# Patient Record
Sex: MALE | Race: WHITE | NOT HISPANIC OR LATINO | Employment: FULL TIME | URBAN - METROPOLITAN AREA
[De-identification: names, ages, dates, MRNs, and addresses within clinical notes are randomized per-mention and may not be internally consistent; named-entity substitution may affect disease eponyms.]

---

## 2023-02-17 ENCOUNTER — OFFICE VISIT (OUTPATIENT)
Dept: FAMILY MEDICINE | Facility: CLINIC | Age: 43
End: 2023-02-17
Payer: COMMERCIAL

## 2023-02-17 VITALS
SYSTOLIC BLOOD PRESSURE: 140 MMHG | HEIGHT: 71 IN | DIASTOLIC BLOOD PRESSURE: 88 MMHG | HEART RATE: 100 BPM | TEMPERATURE: 98.1 F | BODY MASS INDEX: 35.39 KG/M2 | WEIGHT: 252.8 LBS | OXYGEN SATURATION: 97 %

## 2023-02-17 DIAGNOSIS — I10 PRIMARY HYPERTENSION: Primary | ICD-10-CM

## 2023-02-17 DIAGNOSIS — Z13.220 LIPID SCREENING: ICD-10-CM

## 2023-02-17 DIAGNOSIS — F17.200 VAPING NICOTINE DEPENDENCE, NON-TOBACCO PRODUCT: ICD-10-CM

## 2023-02-17 DIAGNOSIS — E66.01 SEVERE OBESITY (BMI 35.0-35.9 WITH COMORBIDITY) (H): ICD-10-CM

## 2023-02-17 PROBLEM — R80.9 PROTEINURIA: Status: ACTIVE | Noted: 2017-02-16

## 2023-02-17 PROBLEM — R06.83 SNORING: Status: ACTIVE | Noted: 2017-02-16

## 2023-02-17 PROBLEM — R03.0 ELEVATED BLOOD PRESSURE READING WITHOUT DIAGNOSIS OF HYPERTENSION: Status: ACTIVE | Noted: 2017-02-16

## 2023-02-17 PROBLEM — R79.89 SERUM CREATININE RAISED: Status: ACTIVE | Noted: 2017-03-30

## 2023-02-17 PROBLEM — E66.9 OBESITY: Status: ACTIVE | Noted: 2023-02-17

## 2023-02-17 LAB
ALBUMIN UR-MCNC: NEGATIVE MG/DL
ANION GAP SERPL CALCULATED.3IONS-SCNC: 11 MMOL/L (ref 7–15)
APPEARANCE UR: CLEAR
BILIRUB UR QL STRIP: NEGATIVE
BUN SERPL-MCNC: 11.8 MG/DL (ref 6–20)
CALCIUM SERPL-MCNC: 9.5 MG/DL (ref 8.6–10)
CHLORIDE SERPL-SCNC: 106 MMOL/L (ref 98–107)
CHOLEST SERPL-MCNC: 178 MG/DL
COLOR UR AUTO: YELLOW
CREAT SERPL-MCNC: 1.06 MG/DL (ref 0.67–1.17)
DEPRECATED HCO3 PLAS-SCNC: 22 MMOL/L (ref 22–29)
GFR SERPL CREATININE-BSD FRML MDRD: 90 ML/MIN/1.73M2
GLUCOSE SERPL-MCNC: 90 MG/DL (ref 70–99)
GLUCOSE UR STRIP-MCNC: NEGATIVE MG/DL
HDLC SERPL-MCNC: 36 MG/DL
HGB UR QL STRIP: NEGATIVE
KETONES UR STRIP-MCNC: NEGATIVE MG/DL
LDLC SERPL CALC-MCNC: 124 MG/DL
LEUKOCYTE ESTERASE UR QL STRIP: NEGATIVE
NITRATE UR QL: NEGATIVE
NONHDLC SERPL-MCNC: 142 MG/DL
PH UR STRIP: 5 [PH] (ref 5–7)
POTASSIUM SERPL-SCNC: 4.2 MMOL/L (ref 3.4–5.3)
SODIUM SERPL-SCNC: 139 MMOL/L (ref 136–145)
SP GR UR STRIP: 1.02 (ref 1–1.03)
TRIGL SERPL-MCNC: 91 MG/DL
UROBILINOGEN UR STRIP-ACNC: 0.2 E.U./DL

## 2023-02-17 PROCEDURE — 99204 OFFICE O/P NEW MOD 45 MIN: CPT | Performed by: NURSE PRACTITIONER

## 2023-02-17 PROCEDURE — 36415 COLL VENOUS BLD VENIPUNCTURE: CPT | Performed by: NURSE PRACTITIONER

## 2023-02-17 PROCEDURE — 81003 URINALYSIS AUTO W/O SCOPE: CPT | Mod: QW | Performed by: NURSE PRACTITIONER

## 2023-02-17 PROCEDURE — 80061 LIPID PANEL: CPT | Performed by: NURSE PRACTITIONER

## 2023-02-17 PROCEDURE — 80048 BASIC METABOLIC PNL TOTAL CA: CPT | Performed by: NURSE PRACTITIONER

## 2023-02-17 RX ORDER — LISINOPRIL 10 MG/1
10 TABLET ORAL DAILY
Qty: 30 TABLET | Refills: 1 | Status: SHIPPED | OUTPATIENT
Start: 2023-02-17

## 2023-02-17 ASSESSMENT — ENCOUNTER SYMPTOMS: HYPERTENSION: 1

## 2023-02-17 NOTE — PROGRESS NOTES
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  Explained the use risks and benefits of lisinopril, labs pending  Plan: Basic metabolic panel, UA reflex to Microscopic        - lab collect, Lipid panel reflex to direct LDL        Fasting        Also counseled that sleep apnea can complicate hypertension.   -Controlled  -Continue current medications as can obesity and nicotine and caffeine use.  He will return in 3 weeks to reassess blood pressure and to again address tobacco use cessation, weight loss.    (Z13.220) Lipid screening  Comment:   Plan: lisinopril (ZESTRIL) 10 MG tablet            (F17.200) Vaping nicotine dependence, non-tobacco product  Comment:   Plan:     (E66.01,  Z68.35) Severe obesity (BMI 35.0-35.9 with comorbidity) (H)  Comment:   Plan:              BMI:   Estimated body mass index is 35.76 kg/m  as calculated from the following:    Height as of this encounter: 1.791 m (5' 10.5\").    Weight as of this encounter: 114.7 kg (252 lb 12.8 oz).   Weight management plan: Discussed healthy diet and exercise guidelines        No follow-ups on file.    Charlotte Steward NP  [facility]    Anna Beatty is a 42 year old presenting for the following health issues: has been having elevated BP reading at home and stores, no diagnoses, is a  and wants to be sure he will pass with a good BP readng----at DOT physicals , bp has been high 160 systolic.  He has a family with kids and wants to make sure he is around to watch them grow up.  He also has this new job as a  and does not want to compromise that    Has been able to cut out sugar, had been drinking 24 pack or Mountain Dew. He " "has since avoided corn syrup    Non smoker but had been a smoker , vaping now the last 5 years    Weight has increased as he is merely driving now, He works 14 hour days. Has maybe gained 12# in last year.    Brings food from home to eat on the road, trying to avoid fast food    Does not want to pursue sleep study at this time as he is afraid will cause an eligibility for driving.  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  He exercises with enough effort to increase his heart rate 7 days per week.   He is taking medications regularly.       Hypertension Follow-up      Do you check your blood pressure regularly outside of the clinic? Yes, off/on    Are you following a low salt diet? Yes, trying to be mindful of what he eats    Are your blood pressures ever more than 140 on the top number (systolic) OR more   than 90 on the bottom number (diastolic), for example 140/90? Yes        Review of Systems         Objective    BP (!) 140/88 (BP Location: Right arm, Patient Position: Sitting)   Pulse 100   Temp 98.1  F (36.7  C)   Ht 1.791 m (5' 10.5\")   Wt 114.7 kg (252 lb 12.8 oz)   SpO2 97%   BMI 35.76 kg/m    Body mass index is 35.76 kg/m .  Physical Exam   He is alert and oriented no acute distress skin warm pink dry no rash neck supple no cervical lymphadenopathy  Heart S1-S2 regular rate rhythm lungs are clear  Blood pressure is elevated                    "

## 2023-02-17 NOTE — LETTER
February 17, 2023      Steven M Kromanaker  [address] [facility] [address]        Dear Mr.Kromanaker,    We are writing to inform you of your test results.    Lab work looks good, see you in about 3 weeks.    Resulted Orders   Basic metabolic panel   Result Value Ref Range    Sodium 139 136 - 145 mmol/L    Potassium 4.2 3.4 - 5.3 mmol/L    Chloride 106 98 - 107 mmol/L    Carbon Dioxide (CO2) 22 22 - 29 mmol/L    Anion Gap 11 7 - 15 mmol/L    Urea Nitrogen 11.8 6.0 - 20.0 mg/dL    Creatinine 1.06 0.67 - 1.17 mg/dL    Calcium 9.5 8.6 - 10.0 mg/dL    Glucose 90 70 - 99 mg/dL    GFR Estimate 90 >60 mL/min/1.73m2      Comment:      eGFR calculated using 2021 CKD-EPI equation.   UA reflex to Microscopic - lab collect   Result Value Ref Range    Color Urine Yellow Colorless, Straw, Light Yellow, Yellow    Appearance Urine Clear Clear    Glucose Urine Negative Negative mg/dL    Bilirubin Urine Negative Negative    Ketones Urine Negative Negative mg/dL    Specific Gravity Urine 1.025 1.003 - 1.035    Blood Urine Negative Negative    pH Urine 5.0 5.0 - 7.0    Protein Albumin Urine Negative Negative mg/dL    Urobilinogen Urine 0.2 0.2, 1.0 E.U./dL    Nitrite Urine Negative Negative    Leukocyte Esterase Urine Negative Negative    Narrative    Microscopic not indicated   Lipid panel reflex to direct LDL Fasting   Result Value Ref Range    Cholesterol 178 <200 mg/dL    Triglycerides 91 <150 mg/dL    Direct Measure HDL 36 (L) >=40 mg/dL    LDL Cholesterol Calculated 124 (H) <=100 mg/dL    Non HDL Cholesterol 142 (H) <130 mg/dL    Narrative    Cholesterol  Desirable:  <200 mg/dL    Triglycerides  Normal:  Less than 150 mg/dL  Borderline High:  150-199 mg/dL  High:  200-499 mg/dL  Very High:  Greater than or equal to 500 mg/dL    Direct Measure HDL  Female:  Greater than or equal to 50 mg/dL   Male:  Greater than or equal to 40 mg/dL    LDL Cholesterol  Desirable:  <100mg/dL  Above Desirable:  100-129 mg/dL   Borderline High:   130-159 mg/dL   High:  160-189 mg/dL   Very High:  >= 190 mg/dL    Non HDL Cholesterol  Desirable:  130 mg/dL  Above Desirable:  130-159 mg/dL  Borderline High:  160-189 mg/dL  High:  190-219 mg/dL  Very High:  Greater than or equal to 220 mg/dL       If you have any questions or concerns, please call the clinic at the number listed above.       Sincerely,      Charlotte Steward NP

## 2023-03-31 ENCOUNTER — OFFICE VISIT (OUTPATIENT)
Dept: FAMILY MEDICINE | Facility: CLINIC | Age: 43
End: 2023-03-31
Payer: COMMERCIAL

## 2023-03-31 VITALS
DIASTOLIC BLOOD PRESSURE: 86 MMHG | OXYGEN SATURATION: 94 % | SYSTOLIC BLOOD PRESSURE: 126 MMHG | BODY MASS INDEX: 34.82 KG/M2 | WEIGHT: 248.7 LBS | HEIGHT: 71 IN | HEART RATE: 101 BPM

## 2023-03-31 DIAGNOSIS — R19.5 LOOSE STOOLS: Primary | ICD-10-CM

## 2023-03-31 DIAGNOSIS — I10 PRIMARY HYPERTENSION: ICD-10-CM

## 2023-03-31 PROCEDURE — 99213 OFFICE O/P EST LOW 20 MIN: CPT | Performed by: NURSE PRACTITIONER

## 2023-03-31 RX ORDER — LISINOPRIL 10 MG/1
10 TABLET ORAL DAILY
Qty: 90 TABLET | Refills: 3 | Status: CANCELLED | OUTPATIENT
Start: 2023-03-31 | End: 2023-06-29

## 2023-03-31 NOTE — PROGRESS NOTES
Assessment & Plan     (R19.5) Loose stools  (primary encounter diagnosis)  Comment:   Plan: Enteric Bacteria and Virus Panel by [facility] Stool            (I10) Primary hypertension  Comment:   Plan:     Stop the lisinopril for 4-5 days and send me a message or call next Wednesday.  If diarrhea stop, I will send in a prescription for a different blood pressure med, if diarrhea continues, re start the lisinopril and return the stool specimens and we will order some blood tests as well.                  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 with an acceptable BP reading, pleased with BP control; however, stomach issues for the past month, diarrhea multiple times a day, denies nausea and vomiting, states he is lactose intolerant to a degress    He trains people how to drive truck  Started lisinopril 6 weeks ago, diarrhea started 3 weeks later  Working hard on lifestyle changes  Has been decreasing vaping  He has cut back on caffiene and soda and salt  He is lactose  Intolerant but doesn't think that is the problems  He has been having some increased stools and urgency for three weeks  Stools are brown and very soft stool        Hypertension, Recheck Medication, and Diarrhea    Additional Questions 3/31/2023   Roomed by SAFIA   Accompanied by no one     History of Present Illness       Hypertension: He presents for follow up of hypertension.  He does check blood pressure  regularly outside of the clinic. Outpatient blood pressures have not been over 140/90. He follows a low salt diet.     Reason for visit:  High blood pressure and digestive issues    He eats 0-1 servings of fruits and vegetables daily.He consumes 2 sweetened beverage(s) daily.He exercises with enough effort to increase his heart rate 10 to 19 minutes per day.  He  "exercises with enough effort to increase his heart rate 7 days per week.   He is taking medications regularly.       Hypertension Follow-up      Do you check your blood pressure regularly outside of the clinic? Yes     Are you following a low salt diet? Yes - trying his hardest    Are your blood pressures ever more than 140 on the top number (systolic) OR more   than 90 on the bottom number (diastolic), for example 140/90? No          Review of Systems         Objective    /86 (BP Location: Right arm, Patient Position: Sitting)   Pulse 101   Ht 1.791 m (5' 10.5\")   Wt 112.8 kg (248 lb 11.2 oz)   SpO2 94%   BMI 35.18 kg/m    Body mass index is 35.18 kg/m .  Physical Exam   GENERAL: healthy, alert and no distress  MS: no gross musculoskeletal defects noted, no edema                    "

## 2023-03-31 NOTE — PATIENT INSTRUCTIONS
Stop the lisinopril for 4-5 days and send me a message or call next Wednesday.  If diarrhea stop, I will send in a prescription for a different blood pressure med, if diarrhea continues, re start the lisinopril and return the stool specimens and we will order some blood tests as well.

## 2023-04-02 ENCOUNTER — TELEPHONE (OUTPATIENT)
Dept: FAMILY MEDICINE | Facility: CLINIC | Age: 43
End: 2023-04-02
Payer: COMMERCIAL

## 2023-04-02 NOTE — TELEPHONE ENCOUNTER
..  Patient Returning Call    Reason for call:  diarrhea    Information relayed to patient:  te    Patient has additional questions:  No      Okay to leave a detailed message?: Yes at Cell number on file:    Telephone Information:   Mobile [phone]

## 2023-04-03 ENCOUNTER — APPOINTMENT (OUTPATIENT)
Dept: FAMILY MEDICINE | Facility: CLINIC | Age: 43
End: 2023-04-03
Payer: COMMERCIAL

## 2023-04-03 ENCOUNTER — OFFICE VISIT (OUTPATIENT)
Dept: FAMILY MEDICINE | Facility: CLINIC | Age: 43
End: 2023-04-03
Payer: COMMERCIAL

## 2023-04-03 VITALS
SYSTOLIC BLOOD PRESSURE: 150 MMHG | BODY MASS INDEX: 34.33 KG/M2 | HEIGHT: 71 IN | OXYGEN SATURATION: 95 % | DIASTOLIC BLOOD PRESSURE: 84 MMHG | TEMPERATURE: 97.9 F | HEART RATE: 69 BPM | WEIGHT: 245.2 LBS

## 2023-04-03 DIAGNOSIS — Z11.59 NEED FOR HEPATITIS C SCREENING TEST: ICD-10-CM

## 2023-04-03 DIAGNOSIS — I10 PRIMARY HYPERTENSION: ICD-10-CM

## 2023-04-03 DIAGNOSIS — R19.7 DIARRHEA, UNSPECIFIED TYPE: Primary | ICD-10-CM

## 2023-04-03 DIAGNOSIS — Z11.4 SCREENING FOR HIV (HUMAN IMMUNODEFICIENCY VIRUS): ICD-10-CM

## 2023-04-03 LAB
ALBUMIN SERPL BCG-MCNC: 4.4 G/DL (ref 3.5–5.2)
ALP SERPL-CCNC: 61 U/L (ref 40–129)
ALT SERPL W P-5'-P-CCNC: 52 U/L (ref 10–50)
ANION GAP SERPL CALCULATED.3IONS-SCNC: 13 MMOL/L (ref 7–15)
AST SERPL W P-5'-P-CCNC: 28 U/L (ref 10–50)
BASOPHILS # BLD AUTO: 0 10E3/UL (ref 0–0.2)
BASOPHILS NFR BLD AUTO: 0 %
BILIRUB SERPL-MCNC: 0.4 MG/DL
BUN SERPL-MCNC: 14.1 MG/DL (ref 6–20)
CALCIUM SERPL-MCNC: 9.2 MG/DL (ref 8.6–10)
CHLORIDE SERPL-SCNC: 110 MMOL/L (ref 98–107)
CREAT SERPL-MCNC: 1.23 MG/DL (ref 0.67–1.17)
DEPRECATED HCO3 PLAS-SCNC: 17 MMOL/L (ref 22–29)
EOSINOPHIL # BLD AUTO: 0.1 10E3/UL (ref 0–0.7)
EOSINOPHIL NFR BLD AUTO: 2 %
ERYTHROCYTE [DISTWIDTH] IN BLOOD BY AUTOMATED COUNT: 12.3 % (ref 10–15)
GFR SERPL CREATININE-BSD FRML MDRD: 76 ML/MIN/1.73M2
GLUCOSE SERPL-MCNC: 84 MG/DL (ref 70–99)
HCT VFR BLD AUTO: 46.5 % (ref 40–53)
HGB BLD-MCNC: 16 G/DL (ref 13.3–17.7)
IMM GRANULOCYTES # BLD: 0 10E3/UL
IMM GRANULOCYTES NFR BLD: 0 %
LYMPHOCYTES # BLD AUTO: 1.6 10E3/UL (ref 0.8–5.3)
LYMPHOCYTES NFR BLD AUTO: 23 %
MCH RBC QN AUTO: 29.1 PG (ref 26.5–33)
MCHC RBC AUTO-ENTMCNC: 34.4 G/DL (ref 31.5–36.5)
MCV RBC AUTO: 85 FL (ref 78–100)
MONOCYTES # BLD AUTO: 0.6 10E3/UL (ref 0–1.3)
MONOCYTES NFR BLD AUTO: 9 %
NEUTROPHILS # BLD AUTO: 4.8 10E3/UL (ref 1.6–8.3)
NEUTROPHILS NFR BLD AUTO: 67 %
PLATELET # BLD AUTO: 203 10E3/UL (ref 150–450)
POTASSIUM SERPL-SCNC: 4.2 MMOL/L (ref 3.4–5.3)
PROT SERPL-MCNC: 7.4 G/DL (ref 6.4–8.3)
RBC # BLD AUTO: 5.5 10E6/UL (ref 4.4–5.9)
SODIUM SERPL-SCNC: 140 MMOL/L (ref 136–145)
TSH SERPL DL<=0.005 MIU/L-ACNC: 2.09 UIU/ML (ref 0.3–4.2)
WBC # BLD AUTO: 7.2 10E3/UL (ref 4–11)

## 2023-04-03 PROCEDURE — 87506 IADNA-DNA/RNA PROBE TQ 6-11: CPT | Performed by: NURSE PRACTITIONER

## 2023-04-03 PROCEDURE — 99214 OFFICE O/P EST MOD 30 MIN: CPT | Performed by: NURSE PRACTITIONER

## 2023-04-03 PROCEDURE — 86803 HEPATITIS C AB TEST: CPT | Performed by: NURSE PRACTITIONER

## 2023-04-03 PROCEDURE — 80050 GENERAL HEALTH PANEL: CPT | Performed by: NURSE PRACTITIONER

## 2023-04-03 PROCEDURE — 87389 HIV-1 AG W/HIV-1&-2 AB AG IA: CPT | Performed by: NURSE PRACTITIONER

## 2023-04-03 PROCEDURE — 36415 COLL VENOUS BLD VENIPUNCTURE: CPT | Performed by: NURSE PRACTITIONER

## 2023-04-03 RX ORDER — LOPERAMIDE HYDROCHLORIDE 2 MG/1
2 TABLET ORAL
Qty: 36 TABLET | Refills: 0 | Status: SHIPPED | OUTPATIENT
Start: 2023-04-03

## 2023-04-03 RX ORDER — AMLODIPINE BESYLATE 2.5 MG/1
2.5 TABLET ORAL DAILY
Qty: 30 TABLET | Refills: 1 | Status: SHIPPED | OUTPATIENT
Start: 2023-04-03

## 2023-04-03 ASSESSMENT — ENCOUNTER SYMPTOMS: DIARRHEA: 1

## 2023-04-03 NOTE — LETTER
April 3, 2023      Steven M Kromanaker  [address] [facility] [address]        To Whom It May Concern:    Steven M Kromanaker was seen in our clinic. He may return to work either 4/4 or 4/5 (depending on how he feels)  without restrictions.      Sincerely,        Charlotte Steward NP

## 2023-04-03 NOTE — LETTER
April 3, 2023      Steven M Kromanaker  [address] [facility] [address]        To Whom It May Concern:    Steven M Kromanaker  was seen on 4/3/2023.  Please excuse him  until 4/5/2023 due to illness.        Sincerely,        Charlotte Steward NP

## 2023-04-03 NOTE — PROGRESS NOTES
24g catheter inserted (L) FA  Attempts x 2.    1L/NS  Start 1450  [id number]  NDC: 8384-0260-05  LOT [id number]  EXP March 2024      1L/NS Start 1540 Stop 1645  NDC: 5553-3817-96  Lot [id number]  Exp:  May 2024      IV flowing per gravity  IV patent, no redness or swelling at site.    Patient tolerated Well.  IV Discontinued catheter intact.

## 2023-04-03 NOTE — PROGRESS NOTES
Assessment & Plan     (R19.7) Diarrhea, unspecified type  (primary encounter diagnosis)  Comment: He felt much better after 2 L of normal saline per IV infusion over 3 hours approximately.  He will use Imodium if needed.  Awaiting stool samples.  We will set him up with GI given the long history of GI problems.  Doubt this current flare is related to the lisinopril  Plan: CBC with Platelets & Differential,         Comprehensive metabolic panel, TSH, loperamide         (IMODIUM A-D) 2 MG tablet, Adult GI          Referral - Consult Only              (Z11.4) Screening for HIV (human immunodeficiency virus)  Comment:   Plan: HIV Antigen Antibody Combo            (Z11.59) Need for hepatitis C screening test  Comment:   Plan: Hepatitis C Screen Reflex to HCV RNA Quant and         Genotype            (I10) Primary hypertension  Comment: I will start him on amlodipine tomorrow.  He can return to work on Wednesday if he is feeling much better and a note was written  Plan: amLODIPine (NORVASC) 2.5 MG tablet                           Charlotte Steward NP  [facility]    Anna Beatty is a 42 year old, presenting for the following health issues: reports history of stomach issues, has been having diarrhea over the past 3-4 weeks, this weekend was very rough, frequent diarrhea, denies nausea, denies vomiting I saw him about 3 days ago for about 3 weeks of diarrhea.  It was thought that this might be related to his lisinopril and I asked him to stop it for a few days    Which she did but the diarrhea worsened over the weekend and he was having 10-15 very loose stools a day.  He started to feel weak and dizzy and asked him to come in today.  Today he is only had about 4-5 stools and some even have a little bit of form but he wants me to know that he actually has a long history of digestive issues for over a year.  He has a cousin on his mother side that  of colon cancer at age 31.  His  "wife is very concerned that he might have something going on.  He never has blood in his stool.  He really does not have abdominal pain and less he is about ready to pass the stool.  He has had no fever no vomiting with this.  He has used no over-the-counter remedies.  Diarrhea        4/3/2023     2:09 PM   Additional Questions   Roomed by SAFIA   Accompanied by no one     Diarrhea    History of Present Illness       Hypertension: He presents for follow up of hypertension.  He does check blood pressure  regularly outside of the clinic. Outpatient blood pressures have not been over 140/90. He follows a low salt diet.     Reason for visit:  High blood pressure and digestive issues    He eats 0-1 servings of fruits and vegetables daily.He consumes 2 sweetened beverage(s) daily.He exercises with enough effort to increase his heart rate 10 to 19 minutes per day.  He exercises with enough effort to increase his heart rate 7 days per week.   He is taking medications regularly.           Review of Systems   Gastrointestinal: Positive for diarrhea.            Objective    /86 (BP Location: Right arm, Patient Position: Sitting)   Pulse 96   Temp 97.9  F (36.6  C)   Ht 1.791 m (5' 10.5\")   Wt 111.2 kg (245 lb 3.2 oz)   SpO2 95%   BMI 34.69 kg/m    Body mass index is 34.69 kg/m .  Physical Exam   Is alert and oriented no acute distress skin is warm pink and dry initial blood pressure is acceptable pulse is about 92.  Heart with bit of a systolic murmur in all areas.    He was brought to the procedure room and nursing staff notified that he needed IV fluids and this was infused over a 2 to 3-hour window, he did have 1 or 2 episodes of stool in the bathroom during that time.                            "

## 2023-04-03 NOTE — TELEPHONE ENCOUNTER
"      S-(situation): diarrhea    B-(background): patient continues to take in po fluids and is urinating.  Stop the lisinopril for 4-5 days and send me a message or call next Wednesday.  If diarrhea stop, I will send in a prescription for a different blood pressure med, if diarrhea continues, re start the lisinopril and return the stool specimens and we will order some blood tests as well.     A-(assessment):   Water stools continue since Friday (every hour)  PATIENT states possibly dehydrated.   Feels \"spent\" with \"tired\" and dizzy off and on.    R-(recommendations): please advise.    "

## 2023-04-04 LAB
C COLI+JEJUNI+LARI FUSA STL QL NAA+PROBE: NOT DETECTED
EC STX1 GENE STL QL NAA+PROBE: NOT DETECTED
EC STX2 GENE STL QL NAA+PROBE: NOT DETECTED
HCV AB SERPL QL IA: NONREACTIVE
HIV 1+2 AB+HIV1 P24 AG SERPL QL IA: NONREACTIVE
NOROV GI+II ORF1-ORF2 JNC STL QL NAA+PR: NOT DETECTED
RVA NSP5 STL QL NAA+PROBE: NOT DETECTED
SALMONELLA SP RPOD STL QL NAA+PROBE: NOT DETECTED
SHIGELLA SP+EIEC IPAH STL QL NAA+PROBE: NOT DETECTED
V CHOL+PARA RFBL+TRKH+TNAA STL QL NAA+PR: NOT DETECTED
Y ENTERO RECN STL QL NAA+PROBE: NOT DETECTED

## 2023-04-21 ENCOUNTER — TRANSFERRED RECORDS (OUTPATIENT)
Dept: HEALTH INFORMATION MANAGEMENT | Facility: CLINIC | Age: 43
End: 2023-04-21
Payer: COMMERCIAL

## 2023-05-20 ENCOUNTER — HEALTH MAINTENANCE LETTER (OUTPATIENT)
Age: 43
End: 2023-05-20

## 2024-07-27 ENCOUNTER — HEALTH MAINTENANCE LETTER (OUTPATIENT)
Age: 44
End: 2024-07-27

## 2025-08-10 ENCOUNTER — HEALTH MAINTENANCE LETTER (OUTPATIENT)
Age: 45
End: 2025-08-10